# Patient Record
Sex: FEMALE | Race: WHITE | Employment: FULL TIME | URBAN - METROPOLITAN AREA
[De-identification: names, ages, dates, MRNs, and addresses within clinical notes are randomized per-mention and may not be internally consistent; named-entity substitution may affect disease eponyms.]

---

## 2017-04-18 ENCOUNTER — HOSPITAL ENCOUNTER (OUTPATIENT)
Dept: MAMMOGRAPHY | Age: 47
Discharge: HOME | End: 2017-04-18
Attending: FAMILY MEDICINE
Payer: COMMERCIAL

## 2017-04-18 DIAGNOSIS — Z12.31 VISIT FOR SCREENING MAMMOGRAM: ICD-10-CM

## 2017-04-18 PROCEDURE — 77067 SCR MAMMO BI INCL CAD: CPT

## 2021-11-28 ENCOUNTER — HOSPITAL ENCOUNTER (OUTPATIENT)
Age: 51
Setting detail: OBSERVATION
Discharge: HOME | End: 2021-11-29
Attending: STUDENT IN AN ORGANIZED HEALTH CARE EDUCATION/TRAINING PROGRAM | Admitting: INTERNAL MEDICINE
Payer: MEDICAID

## 2021-11-28 ENCOUNTER — APPOINTMENT (OUTPATIENT)
Dept: MRI IMAGING | Age: 51
End: 2021-11-28
Attending: INTERNAL MEDICINE
Payer: MEDICAID

## 2021-11-28 PROBLEM — K80.50 CHOLEDOCHOLITHIASIS: Status: ACTIVE | Noted: 2021-11-28

## 2021-11-28 LAB
ALBUMIN SERPL-MCNC: 3.3 G/DL (ref 3.5–5)
ALBUMIN/GLOB SERPL: 1 {RATIO} (ref 1.1–2.2)
ALP SERPL-CCNC: 64 U/L (ref 45–117)
ALT SERPL-CCNC: 21 U/L (ref 12–78)
ANION GAP SERPL CALC-SCNC: 4 MMOL/L (ref 5–15)
APTT PPP: 26.1 SEC (ref 22.1–31)
AST SERPL-CCNC: 11 U/L (ref 15–37)
ATRIAL RATE: 63 BPM
BILIRUB SERPL-MCNC: 0.3 MG/DL (ref 0.2–1)
BUN SERPL-MCNC: 15 MG/DL (ref 6–20)
BUN/CREAT SERPL: 25 (ref 12–20)
CALCIUM SERPL-MCNC: 8.7 MG/DL (ref 8.5–10.1)
CALCULATED P AXIS, ECG09: 75 DEGREES
CALCULATED R AXIS, ECG10: 26 DEGREES
CALCULATED T AXIS, ECG11: 66 DEGREES
CHLORIDE SERPL-SCNC: 108 MMOL/L (ref 97–108)
CO2 SERPL-SCNC: 25 MMOL/L (ref 21–32)
CREAT SERPL-MCNC: 0.59 MG/DL (ref 0.55–1.02)
DIAGNOSIS, 93000: NORMAL
ERYTHROCYTE [DISTWIDTH] IN BLOOD BY AUTOMATED COUNT: 12.7 % (ref 11.5–14.5)
EST. AVERAGE GLUCOSE BLD GHB EST-MCNC: 148 MG/DL
FIBRINOGEN PPP-MCNC: 344 MG/DL (ref 200–475)
GLOBULIN SER CALC-MCNC: 3.4 G/DL (ref 2–4)
GLUCOSE BLD STRIP.AUTO-MCNC: 109 MG/DL (ref 65–117)
GLUCOSE BLD STRIP.AUTO-MCNC: 116 MG/DL (ref 65–117)
GLUCOSE BLD STRIP.AUTO-MCNC: 130 MG/DL (ref 65–117)
GLUCOSE BLD STRIP.AUTO-MCNC: 131 MG/DL (ref 65–117)
GLUCOSE SERPL-MCNC: 117 MG/DL (ref 65–100)
HBA1C MFR BLD: 6.8 % (ref 4–5.6)
HCT VFR BLD AUTO: 37.1 % (ref 35–47)
HGB BLD-MCNC: 12.5 G/DL (ref 11.5–16)
INR PPP: 1.1 (ref 0.9–1.1)
MCH RBC QN AUTO: 30.3 PG (ref 26–34)
MCHC RBC AUTO-ENTMCNC: 33.7 G/DL (ref 30–36.5)
MCV RBC AUTO: 90 FL (ref 80–99)
NRBC # BLD: 0 K/UL (ref 0–0.01)
NRBC BLD-RTO: 0 PER 100 WBC
P-R INTERVAL, ECG05: 172 MS
PLATELET # BLD AUTO: 190 K/UL (ref 150–400)
PMV BLD AUTO: 10.7 FL (ref 8.9–12.9)
POTASSIUM SERPL-SCNC: 3.6 MMOL/L (ref 3.5–5.1)
PROT SERPL-MCNC: 6.7 G/DL (ref 6.4–8.2)
PROTHROMBIN TIME: 11.1 SEC (ref 9–11.1)
Q-T INTERVAL, ECG07: 402 MS
QRS DURATION, ECG06: 84 MS
QTC CALCULATION (BEZET), ECG08: 411 MS
RBC # BLD AUTO: 4.12 M/UL (ref 3.8–5.2)
SERVICE CMNT-IMP: ABNORMAL
SERVICE CMNT-IMP: ABNORMAL
SERVICE CMNT-IMP: NORMAL
SERVICE CMNT-IMP: NORMAL
SODIUM SERPL-SCNC: 137 MMOL/L (ref 136–145)
THERAPEUTIC RANGE,PTTT: NORMAL SECS (ref 58–77)
VENTRICULAR RATE, ECG03: 63 BPM
WBC # BLD AUTO: 6.4 K/UL (ref 3.6–11)

## 2021-11-28 PROCEDURE — 83036 HEMOGLOBIN GLYCOSYLATED A1C: CPT

## 2021-11-28 PROCEDURE — 82962 GLUCOSE BLOOD TEST: CPT

## 2021-11-28 PROCEDURE — 65270000029 HC RM PRIVATE

## 2021-11-28 PROCEDURE — 74011250637 HC RX REV CODE- 250/637: Performed by: INTERNAL MEDICINE

## 2021-11-28 PROCEDURE — G0378 HOSPITAL OBSERVATION PER HR: HCPCS

## 2021-11-28 PROCEDURE — 74181 MRI ABDOMEN W/O CONTRAST: CPT

## 2021-11-28 PROCEDURE — 85027 COMPLETE CBC AUTOMATED: CPT

## 2021-11-28 PROCEDURE — 85730 THROMBOPLASTIN TIME PARTIAL: CPT

## 2021-11-28 PROCEDURE — 80053 COMPREHEN METABOLIC PANEL: CPT

## 2021-11-28 PROCEDURE — 93005 ELECTROCARDIOGRAM TRACING: CPT

## 2021-11-28 RX ORDER — MAGNESIUM SULFATE 100 %
4 CRYSTALS MISCELLANEOUS AS NEEDED
Status: DISCONTINUED | OUTPATIENT
Start: 2021-11-28 | End: 2021-11-29 | Stop reason: HOSPADM

## 2021-11-28 RX ORDER — LISINOPRIL 5 MG/1
5 TABLET ORAL DAILY
COMMUNITY

## 2021-11-28 RX ORDER — GABAPENTIN 300 MG/1
300 CAPSULE ORAL 3 TIMES DAILY
Status: DISCONTINUED | OUTPATIENT
Start: 2021-11-28 | End: 2021-11-29 | Stop reason: HOSPADM

## 2021-11-28 RX ORDER — ONDANSETRON 2 MG/ML
4 INJECTION INTRAMUSCULAR; INTRAVENOUS
Status: DISCONTINUED | OUTPATIENT
Start: 2021-11-28 | End: 2021-11-29 | Stop reason: HOSPADM

## 2021-11-28 RX ORDER — PANTOPRAZOLE SODIUM 40 MG/1
40 TABLET, DELAYED RELEASE ORAL DAILY
COMMUNITY

## 2021-11-28 RX ORDER — HYDRALAZINE HYDROCHLORIDE 20 MG/ML
10 INJECTION INTRAMUSCULAR; INTRAVENOUS
Status: DISCONTINUED | OUTPATIENT
Start: 2021-11-28 | End: 2021-11-29 | Stop reason: HOSPADM

## 2021-11-28 RX ORDER — GABAPENTIN 300 MG/1
300 CAPSULE ORAL 3 TIMES DAILY
COMMUNITY

## 2021-11-28 RX ORDER — POLYETHYLENE GLYCOL 3350 17 G/17G
17 POWDER, FOR SOLUTION ORAL DAILY PRN
Status: DISCONTINUED | OUTPATIENT
Start: 2021-11-28 | End: 2021-11-29 | Stop reason: HOSPADM

## 2021-11-28 RX ORDER — SERTRALINE HYDROCHLORIDE 50 MG/1
50 TABLET, FILM COATED ORAL
Status: DISCONTINUED | OUTPATIENT
Start: 2021-11-28 | End: 2021-11-29 | Stop reason: HOSPADM

## 2021-11-28 RX ORDER — ROSUVASTATIN CALCIUM 40 MG/1
40 TABLET, COATED ORAL
COMMUNITY

## 2021-11-28 RX ORDER — ACETAMINOPHEN 650 MG/1
650 SUPPOSITORY RECTAL
Status: DISCONTINUED | OUTPATIENT
Start: 2021-11-28 | End: 2021-11-29 | Stop reason: HOSPADM

## 2021-11-28 RX ORDER — DEXTROSE 50 % IN WATER (D50W) INTRAVENOUS SYRINGE
12.5-25 AS NEEDED
Status: DISCONTINUED | OUTPATIENT
Start: 2021-11-28 | End: 2021-11-29 | Stop reason: HOSPADM

## 2021-11-28 RX ORDER — ACETAMINOPHEN 325 MG/1
650 TABLET ORAL
Status: DISCONTINUED | OUTPATIENT
Start: 2021-11-28 | End: 2021-11-29 | Stop reason: HOSPADM

## 2021-11-28 RX ORDER — SODIUM CHLORIDE 0.9 % (FLUSH) 0.9 %
5-40 SYRINGE (ML) INJECTION AS NEEDED
Status: DISCONTINUED | OUTPATIENT
Start: 2021-11-28 | End: 2021-11-29 | Stop reason: HOSPADM

## 2021-11-28 RX ORDER — GUAIFENESIN 100 MG/5ML
81 LIQUID (ML) ORAL DAILY
COMMUNITY

## 2021-11-28 RX ORDER — SERTRALINE HYDROCHLORIDE 50 MG/1
50 TABLET, FILM COATED ORAL
COMMUNITY

## 2021-11-28 RX ORDER — SODIUM CHLORIDE 0.9 % (FLUSH) 0.9 %
5-40 SYRINGE (ML) INJECTION EVERY 8 HOURS
Status: DISCONTINUED | OUTPATIENT
Start: 2021-11-28 | End: 2021-11-29 | Stop reason: HOSPADM

## 2021-11-28 RX ORDER — PANTOPRAZOLE SODIUM 40 MG/1
40 TABLET, DELAYED RELEASE ORAL DAILY
Status: DISCONTINUED | OUTPATIENT
Start: 2021-11-29 | End: 2021-11-29 | Stop reason: HOSPADM

## 2021-11-28 RX ORDER — METFORMIN HYDROCHLORIDE 500 MG/1
500 TABLET, EXTENDED RELEASE ORAL
COMMUNITY

## 2021-11-28 RX ORDER — LISINOPRIL 5 MG/1
5 TABLET ORAL DAILY
Status: DISCONTINUED | OUTPATIENT
Start: 2021-11-29 | End: 2021-11-29 | Stop reason: HOSPADM

## 2021-11-28 RX ORDER — INSULIN LISPRO 100 [IU]/ML
INJECTION, SOLUTION INTRAVENOUS; SUBCUTANEOUS
Status: DISCONTINUED | OUTPATIENT
Start: 2021-11-28 | End: 2021-11-29 | Stop reason: HOSPADM

## 2021-11-28 RX ORDER — ONDANSETRON 4 MG/1
4 TABLET, ORALLY DISINTEGRATING ORAL
Status: DISCONTINUED | OUTPATIENT
Start: 2021-11-28 | End: 2021-11-29 | Stop reason: HOSPADM

## 2021-11-28 RX ADMIN — GABAPENTIN 300 MG: 300 CAPSULE ORAL at 16:47

## 2021-11-28 RX ADMIN — Medication 5 ML: at 14:00

## 2021-11-28 RX ADMIN — ACETAMINOPHEN 650 MG: 325 TABLET ORAL at 13:45

## 2021-11-28 RX ADMIN — SERTRALINE 50 MG: 50 TABLET, FILM COATED ORAL at 22:14

## 2021-11-28 RX ADMIN — GABAPENTIN 300 MG: 300 CAPSULE ORAL at 22:14

## 2021-11-28 RX ADMIN — Medication 10 ML: at 22:00

## 2021-11-28 NOTE — PROGRESS NOTES
Patient arrived to unit. This RN assessed patient resting quietly. No complaints of pain or nausea at this time. Triage hospitalist notified.

## 2021-11-28 NOTE — H&P
History and Physical    Primary Care Provider: Trudy Lux MD    Subjective:     CC: N/V/D    Imani Garcia is a 49 y.o. female with PMH of HTN, DM2, prev episodes of N/V/D every couple of months, for last 10years, lasting round 24 hours, self resolving usually, doesn't need hospitalization usually. Started with same thing couple of nights back, she had more than 10x vomiting and more than 10x diarrhea. however by last night 8pm had stopped vomiting and having diarrhea, by then was in [facility] ED. She wanted to go home 'as her symptoms improved last night'. However she was told to stay for CT a/p findings of cholidocholithiasis and dilated CBD. She was transferred here as they don't have GI team to take care of this there. Currently denies fever/chills/n/v/abdo pain. Been well recently. Review of Systems:  A comprehensive review of systems was negative except for that written in the History of Present Illness. No past medical history on file. Past Surgical History:   Procedure Laterality Date    HX HYSTERECTOMY       Prior to Admission medications    Not on File     Not on File   Family Hx: -ve for Ca at young  SOCIAL HISTORY:  Patient resides at Home. Smoking history: prev smoker  Alcohol history: socially  Objective:     Physical Exam:   /70   Pulse 60   Temp 97.9 °F (36.6 °C)   Resp 16   SpO2 96%   General:  Alert, oriented, No acute distress, comfortable, [employer].  HEENT:  [place] conjunctivae, PERRL, hearing intact to voice, MM moist  Neck:  Supple, without masses, thyroid non-tender  Card:  S1, S2 without murmurs, good peripheral perfusion,   Peripheral pulse: 2+ in all exts. Cap refil <3seconds.   Resp:  No accessory muscle use, no wheezes, no rhonchi  Abd:  Soft, non-tender, non-distended, BS+, no masses  Lymph:  No cervical or inguinal adenopathy  Extremities:  No cyanosis or clubbing, no significant edema  Skin:  No rashes or ulcers, skin turgor is good  Neuro:  Grossly normal, no focal neuro deficits, CNs intact, follows commands   Psych:  Good insight, oriented to person, place and time. ECG:  I personally reviewed: pending. Data Review: All diagnostic labs and studies have been reviewed by myself personally. Imaging I personally reviewed: reports from [facility] of CBD stones, and dilation of 10mm. Assessment:     Active Problems:    Choledocholithiasis (11/28/2021)      Plan:     #. N/V/Diarrhea episodes- seem to have resolved. No further n/v/d since 8pm 11/27. Monitor  #. CBD stones and dilation: size 10mm on CT a/p from [facility].  - patient doesn't have pain/fever. No signs of infections. Hold off on abx for now. - will get MRCP for further details, antiEmetics PRN. Clear liquid diet. GI consult pending. #. HTN: chronic, stable, Home regimen, PRN BP meds. Monitor  #. DM2: A1c pending., SSI, AccuChecks, monitor     Patient's Baseline: ambulates with walking  Code status: Full  DVT prophylaxis: SCDs  Disposition: Home 1-2days.     Signed By: Miki Lesches, MD     November 28, 2021

## 2021-11-28 NOTE — PROGRESS NOTES
Patient presents with a 10 yr history of abdominal pain, nausea, vomiting and diarrhea. Ct scan showed a dilated CBD and 4 mm calculus but MRCP was normal. We will await review of her CT and MRCP report by Radiologist. If There is evidence of CBd stone, she will need ERCP and stone extraction. Detailed consult dictated.

## 2021-11-29 VITALS
HEART RATE: 60 BPM | DIASTOLIC BLOOD PRESSURE: 58 MMHG | OXYGEN SATURATION: 94 % | SYSTOLIC BLOOD PRESSURE: 111 MMHG | TEMPERATURE: 97.6 F | RESPIRATION RATE: 16 BRPM

## 2021-11-29 LAB
ANION GAP SERPL CALC-SCNC: 6 MMOL/L (ref 5–15)
BUN SERPL-MCNC: 10 MG/DL (ref 6–20)
BUN/CREAT SERPL: 16 (ref 12–20)
CALCIUM SERPL-MCNC: 8.8 MG/DL (ref 8.5–10.1)
CHLORIDE SERPL-SCNC: 108 MMOL/L (ref 97–108)
CO2 SERPL-SCNC: 25 MMOL/L (ref 21–32)
CREAT SERPL-MCNC: 0.64 MG/DL (ref 0.55–1.02)
ERYTHROCYTE [DISTWIDTH] IN BLOOD BY AUTOMATED COUNT: 12.9 % (ref 11.5–14.5)
GLUCOSE BLD STRIP.AUTO-MCNC: 138 MG/DL (ref 65–117)
GLUCOSE SERPL-MCNC: 132 MG/DL (ref 65–100)
HCT VFR BLD AUTO: 38.9 % (ref 35–47)
HGB BLD-MCNC: 13.4 G/DL (ref 11.5–16)
MCH RBC QN AUTO: 30.8 PG (ref 26–34)
MCHC RBC AUTO-ENTMCNC: 34.4 G/DL (ref 30–36.5)
MCV RBC AUTO: 89.4 FL (ref 80–99)
NRBC # BLD: 0 K/UL (ref 0–0.01)
NRBC BLD-RTO: 0 PER 100 WBC
PLATELET # BLD AUTO: 206 K/UL (ref 150–400)
PMV BLD AUTO: 10.7 FL (ref 8.9–12.9)
POTASSIUM SERPL-SCNC: 3.6 MMOL/L (ref 3.5–5.1)
RBC # BLD AUTO: 4.35 M/UL (ref 3.8–5.2)
SERVICE CMNT-IMP: ABNORMAL
SODIUM SERPL-SCNC: 139 MMOL/L (ref 136–145)
WBC # BLD AUTO: 6.2 K/UL (ref 3.6–11)

## 2021-11-29 PROCEDURE — 80048 BASIC METABOLIC PNL TOTAL CA: CPT

## 2021-11-29 PROCEDURE — 85027 COMPLETE CBC AUTOMATED: CPT

## 2021-11-29 PROCEDURE — G0378 HOSPITAL OBSERVATION PER HR: HCPCS

## 2021-11-29 PROCEDURE — 74011250637 HC RX REV CODE- 250/637: Performed by: INTERNAL MEDICINE

## 2021-11-29 PROCEDURE — 82962 GLUCOSE BLOOD TEST: CPT

## 2021-11-29 PROCEDURE — 36415 COLL VENOUS BLD VENIPUNCTURE: CPT

## 2021-11-29 RX ADMIN — Medication 10 ML: at 06:00

## 2021-11-29 RX ADMIN — PANTOPRAZOLE SODIUM 40 MG: 40 TABLET, DELAYED RELEASE ORAL at 08:39

## 2021-11-29 RX ADMIN — GABAPENTIN 300 MG: 300 CAPSULE ORAL at 08:39

## 2021-11-29 NOTE — PROGRESS NOTES
Bedside and Verbal shift change report given to Ben Flores (oncoming nurse) by Benji Neville RN (offgoing nurse). Report included the following information SBAR, Kardex, Intake/Output, MAR and Recent Results.

## 2021-11-29 NOTE — DISCHARGE INSTRUCTIONS
Discharge Instructions       PATIENT ID: Guido Goldsmith  MRN: [id number]   YOB: 1970    DATE OF ADMISSION: 11/28/2021  8:52 AM    DATE OF DISCHARGE: 11/29/2021    PRIMARY CARE PROVIDER: Yany Mosqueda MD     ATTENDING PHYSICIAN: Margi Michel MD  DISCHARGING PROVIDER: Marce Hou MD    To contact this individual call [phone] and ask the  to page. If unavailable ask to be transferred the Adult Hospitalist Department. DISCHARGE DIAGNOSES Nausea/Vomiting/ diarrhea episode. CONSULTATIONS: IP CONSULT TO GASTROENTEROLOGY    PROCEDURES/SURGERIES: * No surgery found *    FOLLOW UP APPOINTMENTS:   Follow-up Information     Follow up With Specialties Details Why Contact Info    Yany Mosqueda MD Family Medicine In 1 week  [address] [phone]             ADDITIONAL CARE RECOMMENDATIONS: follow up with PCP    DIET: Resume previous diet    DISCHARGE MEDICATIONS:      · It is important that you take the medication exactly as they are prescribed. · Keep your medication in the bottles provided by the pharmacist and keep a list of the medication names, dosages, and times to be taken in your wallet. · Do not take other medications without consulting your doctor. NOTIFY YOUR PHYSICIAN FOR ANY OF THE FOLLOWING:   Fever over 101 degrees for 24 hours. Chest pain, shortness of breath, fever, chills, nausea, vomiting, diarrhea, change in mentation, falling, weakness, bleeding. Severe pain or pain not relieved by medications. Or, any other signs or symptoms that you may have questions about. It was our pleasure to help take care of you and we hope you get well very soon.     DISPOSITION:    Home With:   OT  PT  HH  RN       SNF/Inpatient Rehab/LTAC   x Independent/assisted living    Hospice    Other:     CDMP Checked:   Yes x     PROBLEM LIST Updated:  Yes x     Signed:   Marce Hou MD  11/29/2021  7:56 AM

## 2021-11-29 NOTE — PROGRESS NOTES
Discharge medications reviewed with patient and spouse and appropriate educational materials and side effects teaching were provided. I have reviewed discharge instructions with the patient and spouse. The patient and spouse verbalized understanding. Patient refused wheelchair assistance during this discharge and requested to walk out of hospital with spouse. Patient is alert and oriented x4 with a steady gait.

## 2021-11-29 NOTE — CONSULTS
[address]    Name:  Liset Gonzalez  MR#:  [id number]  :  1970  ACCOUNT #:  [de-identified]  DATE OF SERVICE:  2021    GASTROENTEROLOGY CONSULT    CHIEF COMPLAINT:  Abdominal pain, nausea, vomiting, and diarrhea for 10 years. REASON FOR CONSULTATION:  Dr. George Culver asked me to evaluate the patient for possible CBD stone. HISTORY OF PRESENT ILLNESS:  The patient is a 26-year-old lady who has presented with a history of recurrent episodes of abdominal pain, nausea, vomiting, and diarrhea for the past 10 years. These episodes usually occur every one to two months and they last about 24 hours. Yesterday around 8 p.m., she developed an acute episode of nausea, vomiting, diarrhea, and abdominal pain, and went to the emergency room at [facility].  There, she had an episode of vomiting and diarrhea and her pain subsided. Usually, her pain lasts one to two hours. She has seen a gastroenterologist before and she had undergone a gastric emptying study that was normal.  In the emergency room, a CT scan was performed, which revealed a 10-mm duct and a 4-mm calculus in the distal CBD, and she was transferred to [facility] for further management as the [facility] did not have a gastroenterologist.  She came to the hospital and was admitted, and underwent an MRCP today, which reportedly did not show any bile duct dilation or filling defect in the distal CBD. The patient is currently asymptomatic and she denies any pain, diarrhea, or vomiting. PAST MEDICAL HISTORY:  Significant for,  1. Hysterectomy. 2.  Recurrent episodes of nausea, vomiting, and diarrhea. SOCIAL HISTORY:  She is an ex-smoker and a social drinker. She does not use recreational drug. REVIEW OF SYSTEMS:  CONSTITUTIONAL:  Negative for anorexia, fever, weight loss. HEAD, EYES, ENT:  No dysphagia or blurred vision.   RESPIRATORY:  No cough, expectoration, or hemoptysis. CARDIOVASCULAR:  No chest pain, syncope, or palpitations. GASTROINTESTINAL:  Positive for abdominal pain, diarrhea, vomiting. CENTRAL NERVOUS SYSTEM:  No history of seizures or loss of consciousness. Review of lymphatic, hematologic, musculoskeletal, genitourinary, endocrine, and psychiatric systems revealed no other abnormalities. All other systems are negative. PHYSICAL EXAMINATION:  GENERAL:  She is alert, comfortable. VITAL SIGNS:  Blood pressure 107/70, pulse is 60, temperature 97.9, respiratory rate of 16. HEAD, EYES, AND ENT:  Pupils are equal, reactive to light and accommodation. Eye movements are normal.  NECK:  Supple. There is no carotid bruit or jugular venous distention. CHEST:  Clear to auscultation. No crackles or wheeze. CARDIOVASCULAR:  Regular rate and rhythm. First and second sounds are normal.  No murmurs. ABDOMEN:  Soft, nontender. Normoactive bowel sounds. CENTRAL NERVOUS SYSTEM:  The patient is alert and oriented x3. There are no gross sensory, motor, or neurological deficits. EXTREMITIES:  Negative for cyanosis, clubbing, or edema. LABORATORY DATA:  White count of 6.4, hemoglobin 12.5, platelets are 979. Sodium 137, potassium 3.6, glucose 117, creatinine 0.59. Liver enzymes are normal.  Bilirubin is 0.3. Review of CT scan report from [facility] reveals a 10-mm bile duct with a 4-mm filling defect. MRCP performed here does not reveal any dilated duct or calculus. Interestingly, the CT scan shows evidence of endometritis, but the patient states that she has already had a hysterectomy in the past.    ASSESSMENT:  A 56-year-old lady who has presented with recurrent episodes of vomiting, diarrhea, and abdominal pain for over 10 years. The differential diagnosis includes irritable bowel syndrome, cyclical vomiting. She has a history of familial hypertriglyceridemia and current takes Crestor. Her triglyceride level is 283 and her LDL is 186.   I have discussed the case with the radiologist and called Dr. Francine Salazar, and he is going to review her CT scan as well as the magnetic resonance cholangiopancreatography results, and will give us an opinion about the discrepant findings between the CT and the magnetic resonance cholangiopancreatography. If there is evidence of stone on the common bile duct, she will need an endoscopic retrograde cholangiopancreatography, sphincterotomy, and stone extraction. However, if there is no stone found, she could probably go home. Dr. Richmond Mckenna will follow the patient tomorrow. RECOMMENDATIONS:  1. Review of CT and MRCP report by Dr. Francine Salazar. 2.  If there is no evidence of CBD stone, the patient can be discharged home, with followup with the primary gastroenterologist.  3.  If there is an evidence of CBD stone, we will proceed with ERCP, sphincterotomy, and stone extraction. Thank you for the consultation.       Natividad Ramirez MD      [id number]  D:  11/28/2021 16:45  T:  11/28/2021 19:41  JOB #:  [id number]  CC:  Gonzalez Mcdonald MD

## 2021-11-29 NOTE — DISCHARGE SUMMARY
Discharge Summary       PATIENT ID: Erin Leon  MRN: [id number]   YOB: 1970    DATE OF ADMISSION: 11/28/2021  8:52 AM    DATE OF DISCHARGE: 11/29/2021   PRIMARY CARE PROVIDER: Tara Edge MD     ATTENDING PHYSICIAN: Krish Lizarraga MD  DISCHARGING PROVIDER: Krish Lizarraga MD    To contact this individual call [phone] and ask the  to page. If unavailable ask to be transferred the Adult Hospitalist Department. CONSULTATIONS: IP CONSULT TO GASTROENTEROLOGY    PROCEDURES/SURGERIES: * No surgery found *    ADMITTING DIAGNOSES & HOSPITAL COURSE:   Evaluated and treated for N/V/Diarrhea episodes, resolved within 24 hours. Initial CT showing dilated CBD with stones, MRCP ruled this out. Risk of Re-Admission: low  DISCHARGE DIAGNOSES / PLAN:      #. N/V/Diarrhea episodes- seem to have resolved. No further n/v/d since 8pm 11/27. Monitor  #. CBD stones and dilation: size 10mm on CT a/p from [facility].  - patient doesn't have pain/fever. No signs of infections. Hold off on abx for now. - MRCP: NAD, no dilated CBD, no CBD stones, antiEmetics PRN. GI evaluated.     #. HTN: chronic, stable, Home regimen, PRN BP meds. Monitor  #. DM2: A1c 6. 8. home regimen. FOLLOW UP APPOINTMENTS:    Follow-up Information     Follow up With Specialties Details Why Piyush Rosales MD Family Medicine In 1 week  [address] [phone]           ADDITIONAL CARE RECOMMENDATIONS:  Follow up with PCP    DIET: Resume previous diet    ACTIVITY: Activity as tolerated    DISCHARGE MEDICATIONS:  Current Discharge Medication List      CONTINUE these medications which have NOT CHANGED    Details   aspirin 81 mg chewable tablet Take 81 mg by mouth daily. lisinopriL (PRINIVIL, ZESTRIL) 5 mg tablet Take 5 mg by mouth daily.       !! rosuvastatin (Crestor) 40 mg tablet Take 40 mg by mouth nightly.      gabapentin (NEURONTIN) 300 mg capsule Take 300 mg by mouth three (3) times daily. sertraline (Zoloft) 50 mg tablet Take 50 mg by mouth nightly. metFORMIN ER (GLUCOPHAGE XR) 500 mg tablet Take 500 mg by mouth daily (with dinner). pantoprazole (Protonix) 40 mg tablet Take 40 mg by mouth daily. !! rosuvastatin (Crestor) 40 mg tablet Take 40 mg by mouth nightly. !! - Potential duplicate medications found. Please discuss with provider. NOTIFY YOUR PHYSICIAN FOR ANY OF THE FOLLOWING:   Fever over 101 degrees for 24 hours. Chest pain, shortness of breath, fever, chills, nausea, vomiting, diarrhea, change in mentation, falling, weakness, bleeding. Severe pain or pain not relieved by medications. Or, any other signs or symptoms that you may have questions about. DISPOSITION:    Home With:   OT  PT  HH  RN       Long term SNF/Inpatient Rehab   x Independent/assisted living    Hospice    Other:     PATIENT CONDITION AT DISCHARGE:     Functional status    Poor     Deconditioned     Independent      Cognition     Lucid     Forgetful     Dementia      Catheters/lines (plus indication)    Perez     PICC     PEG     None      Code status     Full code     DNR      PHYSICAL EXAMINATION AT DISCHARGE:  Patient seen and examined at bedside, Condition stable, explained discharge and follow up plans. BP (!) 111/58   Pulse 60   Temp 97.6 °F (36.4 °C)   Resp 16   SpO2 94%   General:  Alert, oriented, No acute distress. Comfortable  Abdomen: soft, nontender. BS+ve  Resp:  No accessory muscle use, Good AE. Neuro:  Grossly normal, no focal neuro deficits, follows commands     CHRONIC MEDICAL DIAGNOSES:  Problem List as of 11/29/2021 Never Reviewed          Codes Class Noted - Resolved    Choledocholithiasis ICD-10-CM: K80.50  ICD-9-CM: 574.50  11/28/2021 - Present              37 minutes were spent with the patient on counseling and coordination of care.     Signed:   Jamarcus Cedeño MD  11/29/2021  7:57 AM

## 2021-11-29 NOTE — ROUTINE PROCESS
Bedside shift change report given to Blanca RN (oncoming nurse) by Nayla Peralta RN (offgoing nurse). Report included the following information SBAR and Kardex.